# Patient Record
Sex: FEMALE | Race: WHITE | NOT HISPANIC OR LATINO | Employment: OTHER | URBAN - METROPOLITAN AREA
[De-identification: names, ages, dates, MRNs, and addresses within clinical notes are randomized per-mention and may not be internally consistent; named-entity substitution may affect disease eponyms.]

---

## 2019-09-16 ENCOUNTER — TELEPHONE (OUTPATIENT)
Dept: SMOKING CESSATION | Facility: CLINIC | Age: 64
End: 2019-09-16

## 2019-09-16 NOTE — TELEPHONE ENCOUNTER
Called to check on another patient. The patient would like to come to the program and is already signed for the trust. Appt made for 10/1/19 @3446

## 2019-10-21 ENCOUNTER — TELEPHONE (OUTPATIENT)
Dept: SMOKING CESSATION | Facility: CLINIC | Age: 64
End: 2019-10-21

## 2019-10-21 ENCOUNTER — CLINICAL SUPPORT (OUTPATIENT)
Dept: SMOKING CESSATION | Facility: CLINIC | Age: 64
End: 2019-10-21
Payer: COMMERCIAL

## 2019-10-21 DIAGNOSIS — F17.210 MODERATE SMOKER (20 OR LESS PER DAY): Primary | ICD-10-CM

## 2019-10-21 PROCEDURE — 99407 BEHAV CHNG SMOKING > 10 MIN: CPT | Mod: S$GLB,,,

## 2019-10-21 PROCEDURE — 99407 PR TOBACCO USE CESSATION INTENSIVE >10 MINUTES: ICD-10-PCS | Mod: S$GLB,,,

## 2019-10-21 NOTE — TELEPHONE ENCOUNTER
Called patient. Missed intake. Spoke with patient and sister-in-law, Richa Light, on speaker phone.  Explained to the patient what the Trust was, how it evolved and what benefits she could expect and the cost is free. They appeared  to be confused about how this was gong to be free and re-explained. What was confusing the patients was the offer to do phone Counseling from [employer]. Explained the difference in the 2 programs and explained the most important part to quitting smoking is the education to learn how to break the habit. Explained the medication is to help with the nicotine withdrawal. Discussed how the sessions are done in groups or individual and can be done weekly or every 2 weeks. She stated she does not want to do group and explained that is fine. Both agreed it would need to be individual. She requested the trust number [phone] because she had not received her package and she wanted to know why. She will call and ask The pt. has been cutting back on her smoking with her sister-in-law and feels she would like to try the Phone  first to see if she can make progress. She does not feel she needs the [facility] at this time. Advised the [employer] will try to help her and if she feels she is not progressing, she can get in this program.  The patient was provided with the [employer] number on her benefit card she was had in her hand. She was provided with the Scheduling number if she wants to come to this program [phone] and my name Louise Rae and phone number [phone] if she needed any more information about the trust and the [facility] Program. Asked the patient is this helped to understand the trust, how it works and what to do, she stated yes, it helped make it much cleared and she thanked me for the assistance. Will let patient and sister in -law, Elly Hernandes , who agreed with proposed plan of treatment call if have further questions or needs.

## 2019-10-21 NOTE — PROGRESS NOTES
Called patient. Missed intake. Spoke with patient and sister-in-law, Richa Light, on speaker phone, directly to each.   Explained to the patient what the Trust was, how it evolved and what benefits she could expect and the cost is free. They appeared  to be confused about how this was gong to be free and re-explained. What was confusing the patients was the offer to do phone Counseling from [employer]. Explained the difference in the 2 programs and explained the most important part to quitting smoking is the education to learn how to break the habit. Explained the medication is to help with the nicotine withdrawal. Discussed how the sessions are done in groups or individual and can be done weekly or every 2 weeks. She stated she does not want to do group and explained that is fine. Both agreed it would need to be individual. She requested the trust number [phone] because she had not received her package and she wanted to know why. She will call and ask The pt. has been cutting back on her smoking with her sister-in-law and feels she would like to try the Phone  first to see if she can make progress. She does not feel she needs the sessions from [facility] at this time. Advised the [employer] will try to help her and if she feels she is not progressing, she can get in this program.  The patient was provided with the [employer] number on her benefit card she was had in her hand. She was provided with the Scheduling number if she wants to come to this program [phone] and my name Louise Rae and phone number [phone] if she needed any more information about the trust and the [facility] [place] [facility]. Asked the patient is this helped to understand the trust, how it works and what to do, she stated yes, it helped make it much cleared and she thanked me for the assistance. Will let patient and sister in -law, Elly Hernandes , who agreed with proposed plan of treatment call if have further questions  or needs.

## 2021-07-01 ENCOUNTER — PATIENT MESSAGE (OUTPATIENT)
Dept: ADMINISTRATIVE | Facility: OTHER | Age: 66
End: 2021-07-01